# Patient Record
Sex: FEMALE | Race: ASIAN | NOT HISPANIC OR LATINO | Employment: PART TIME | URBAN - METROPOLITAN AREA
[De-identification: names, ages, dates, MRNs, and addresses within clinical notes are randomized per-mention and may not be internally consistent; named-entity substitution may affect disease eponyms.]

---

## 2022-07-16 ENCOUNTER — HOSPITAL ENCOUNTER (EMERGENCY)
Facility: HOSPITAL | Age: 22
Discharge: HOME | End: 2022-07-16
Attending: EMERGENCY MEDICINE | Admitting: EMERGENCY MEDICINE
Payer: COMMERCIAL

## 2022-07-16 ENCOUNTER — APPOINTMENT (OUTPATIENT)
Dept: RADIOLOGY | Facility: HOSPITAL | Age: 22
End: 2022-07-16
Attending: EMERGENCY MEDICINE
Payer: COMMERCIAL

## 2022-07-16 VITALS
OXYGEN SATURATION: 99 % | HEIGHT: 63 IN | SYSTOLIC BLOOD PRESSURE: 115 MMHG | TEMPERATURE: 98.6 F | DIASTOLIC BLOOD PRESSURE: 67 MMHG | HEART RATE: 78 BPM | RESPIRATION RATE: 24 BRPM | BODY MASS INDEX: 40.29 KG/M2 | WEIGHT: 227.4 LBS

## 2022-07-16 DIAGNOSIS — R07.9 CHEST PAIN, UNSPECIFIED TYPE: ICD-10-CM

## 2022-07-16 LAB
ALBUMIN SERPL-MCNC: 3.9 G/DL (ref 3.5–5)
ALBUMIN UR-MCNC: NEGATIVE MG/DL
ALP SERPL-CCNC: 97 U/L (ref 45–120)
ALT SERPL W P-5'-P-CCNC: 15 U/L (ref 0–45)
ANION GAP SERPL CALCULATED.3IONS-SCNC: 7 MMOL/L (ref 5–18)
APPEARANCE UR: CLEAR
AST SERPL W P-5'-P-CCNC: 14 U/L (ref 0–40)
BASOPHILS # BLD AUTO: 0 10E3/UL (ref 0–0.2)
BASOPHILS NFR BLD AUTO: 0 %
BILIRUB SERPL-MCNC: 0.2 MG/DL (ref 0–1)
BILIRUB UR QL STRIP: NEGATIVE
BNP SERPL-MCNC: 16 PG/ML (ref 0–64)
BUN SERPL-MCNC: 17 MG/DL (ref 8–22)
CALCIUM SERPL-MCNC: 8.7 MG/DL (ref 8.5–10.5)
CHLORIDE BLD-SCNC: 113 MMOL/L (ref 98–107)
CO2 SERPL-SCNC: 17 MMOL/L (ref 22–31)
COLOR UR AUTO: COLORLESS
CREAT SERPL-MCNC: 0.84 MG/DL (ref 0.6–1.1)
D DIMER PPP FEU-MCNC: 0.27 UG/ML FEU (ref 0–0.5)
EOSINOPHIL # BLD AUTO: 0.1 10E3/UL (ref 0–0.7)
EOSINOPHIL NFR BLD AUTO: 1 %
ERYTHROCYTE [DISTWIDTH] IN BLOOD BY AUTOMATED COUNT: 14.9 % (ref 10–15)
GFR SERPL CREATININE-BSD FRML MDRD: >90 ML/MIN/1.73M2
GLUCOSE BLD-MCNC: 94 MG/DL (ref 70–125)
GLUCOSE UR STRIP-MCNC: NEGATIVE MG/DL
HCT VFR BLD AUTO: 41.6 % (ref 35–47)
HGB BLD-MCNC: 13.6 G/DL (ref 11.7–15.7)
HGB UR QL STRIP: NEGATIVE
IMM GRANULOCYTES # BLD: 0 10E3/UL
IMM GRANULOCYTES NFR BLD: 0 %
KETONES UR STRIP-MCNC: NEGATIVE MG/DL
LEUKOCYTE ESTERASE UR QL STRIP: NEGATIVE
LYMPHOCYTES # BLD AUTO: 2.1 10E3/UL (ref 0.8–5.3)
LYMPHOCYTES NFR BLD AUTO: 23 %
MCH RBC QN AUTO: 29.4 PG (ref 26.5–33)
MCHC RBC AUTO-ENTMCNC: 32.7 G/DL (ref 31.5–36.5)
MCV RBC AUTO: 90 FL (ref 78–100)
MONOCYTES # BLD AUTO: 0.9 10E3/UL (ref 0–1.3)
MONOCYTES NFR BLD AUTO: 10 %
NEUTROPHILS # BLD AUTO: 6.2 10E3/UL (ref 1.6–8.3)
NEUTROPHILS NFR BLD AUTO: 66 %
NITRATE UR QL: NEGATIVE
NRBC # BLD AUTO: 0 10E3/UL
NRBC BLD AUTO-RTO: 0 /100
PH UR STRIP: 7.5 [PH] (ref 5–7)
PLATELET # BLD AUTO: 304 10E3/UL (ref 150–450)
POTASSIUM BLD-SCNC: 3.7 MMOL/L (ref 3.5–5)
PROT SERPL-MCNC: 8 G/DL (ref 6–8)
RBC # BLD AUTO: 4.62 10E6/UL (ref 3.8–5.2)
RBC URINE: <1 /HPF
SODIUM SERPL-SCNC: 137 MMOL/L (ref 136–145)
SP GR UR STRIP: 1.01 (ref 1–1.03)
SQUAMOUS EPITHELIAL: 1 /HPF
TROPONIN I SERPL-MCNC: <0.01 NG/ML (ref 0–0.29)
UROBILINOGEN UR STRIP-MCNC: <2 MG/DL
WBC # BLD AUTO: 9.3 10E3/UL (ref 4–11)
WBC URINE: <1 /HPF

## 2022-07-16 PROCEDURE — 85018 HEMOGLOBIN: CPT | Performed by: EMERGENCY MEDICINE

## 2022-07-16 PROCEDURE — 36415 COLL VENOUS BLD VENIPUNCTURE: CPT | Performed by: EMERGENCY MEDICINE

## 2022-07-16 PROCEDURE — 80053 COMPREHEN METABOLIC PANEL: CPT | Performed by: EMERGENCY MEDICINE

## 2022-07-16 PROCEDURE — 81001 URINALYSIS AUTO W/SCOPE: CPT | Performed by: EMERGENCY MEDICINE

## 2022-07-16 PROCEDURE — 93005 ELECTROCARDIOGRAM TRACING: CPT | Performed by: EMERGENCY MEDICINE

## 2022-07-16 PROCEDURE — 96374 THER/PROPH/DIAG INJ IV PUSH: CPT

## 2022-07-16 PROCEDURE — 250N000011 HC RX IP 250 OP 636: Performed by: EMERGENCY MEDICINE

## 2022-07-16 PROCEDURE — 85379 FIBRIN DEGRADATION QUANT: CPT | Performed by: EMERGENCY MEDICINE

## 2022-07-16 PROCEDURE — 71045 X-RAY EXAM CHEST 1 VIEW: CPT

## 2022-07-16 PROCEDURE — 99285 EMERGENCY DEPT VISIT HI MDM: CPT | Mod: 25

## 2022-07-16 PROCEDURE — 83880 ASSAY OF NATRIURETIC PEPTIDE: CPT | Performed by: EMERGENCY MEDICINE

## 2022-07-16 PROCEDURE — 84484 ASSAY OF TROPONIN QUANT: CPT | Performed by: EMERGENCY MEDICINE

## 2022-07-16 RX ORDER — KETOROLAC TROMETHAMINE 15 MG/ML
15 INJECTION, SOLUTION INTRAMUSCULAR; INTRAVENOUS ONCE
Status: COMPLETED | OUTPATIENT
Start: 2022-07-16 | End: 2022-07-16

## 2022-07-16 RX ADMIN — KETOROLAC TROMETHAMINE 15 MG: 15 INJECTION, SOLUTION INTRAMUSCULAR; INTRAVENOUS at 20:50

## 2022-07-17 NOTE — ED PROVIDER NOTES
EMERGENCY DEPARTMENT NOTE     Name: Catherine Cantor    Age/Sex: 22 year old female   MRN: [id number]   Evaluation Date & Time:  7/16/2022  8:06 PM    PCP:    No primary care provider on file.   ED Provider: Wilbert Fuentes D.O.       CHIEF COMPLAINT    Chest Pain and Shortness of Breath       DIAGNOSIS & DISPOSITION     1. Chest pain, unspecified type      DISPOSITION: Home    At the conclusion of the encounter I discussed the results of all of the tests and the disposition. The questions were answered. The patient or family acknowledged understanding and was agreeable with the care plan.    TOTAL CRITICAL CARE TIME (EXCLUDING PROCEDURES): Not applicable    PROCEDURES:   None    EMERGENCY DEPARTMENT COURSE/MEDICAL DECISION MAKING   8:07 PM I met with the patient to gather history and to perform my initial exam.  We discussed treatment options and the plan for care while in the Emergency Department.  10:11 PM We discussed the plan for discharge and the patient is agreeable. Reviewed supportive cares, symptomatic treatment, outpatient follow up, and reasons to return to the Emergency Department. Patient to be discharged by ED RN.     Catherine Cantor is a 22 year old female without significant past medical history who presents to the emergency department for evaluation of chest pain.  Triage note reviewed:  Pt report mid sternal chest pain for 3 days with shortness of breath.  Pt states increase shortness of breath with exertion      Triage Assessment     Row Name 07/16/22 2004       Triage Assessment (Adult)    Airway WDL WDL       Respiratory WDL    Respiratory WDL WDL       Skin Circulation/Temperature WDL    Skin Circulation/Temperature WDL WDL       Cardiac WDL    Cardiac WDL WDL       Peripheral/Neurovascular WDL    Peripheral Neurovascular WDL WDL       Cognitive/Neuro/Behavioral WDL    Cognitive/Neuro/Behavioral WDL WDL                Emergent causes of chest pain considered included but not limited to ACS,  "myocarditis/pericarditis, pulmonary embolism, thoracic aortic dissection, pneumothorax.  Patient does not have associated abdominal pain or history of vomiting to suggest Boerhaave syndrome.  Abdomen is nontender and reported no GI symptoms but considered referred GI source.  Vital signs:/67   Pulse 78   Temp 98.6  F (37  C) (Temporal)   Resp 24   Ht 1.6 m (5' 3\")   Wt 103.1 kg (227 lb 6.4 oz)   SpO2 99%   BMI 40.28 kg/m    Pertinent physical exam findings:  Cardiac: Regular rate and rhythm S1-S2 without murmur rub  Pulmonary: Lungs are clear to ascultation bilaterally with good breath sounds  Chest wall: Reproducible tenderness left sternal costal junction  Abdomen: Soft nontender, positive bowel sounds.  No organomegaly or mass  Diagnostic studies:  Imaging:  XR Chest Port 1 View   Final Result   IMPRESSION: Negative chest.         Lab:  Labs Ordered and Resulted from Time of ED Arrival to Time of ED Departure   COMPREHENSIVE METABOLIC PANEL - Abnormal       Result Value    Sodium 137      Potassium 3.7      Chloride 113 (*)     Carbon Dioxide (CO2) 17 (*)     Anion Gap 7      Urea Nitrogen 17      Creatinine 0.84      Calcium 8.7      Glucose 94      Alkaline Phosphatase 97      AST 14      ALT 15      Protein Total 8.0      Albumin 3.9      Bilirubin Total 0.2      GFR Estimate >90     ROUTINE UA WITH MICROSCOPIC REFLEX TO CULTURE - Abnormal    Color Urine Colorless      Appearance Urine Clear      Glucose Urine Negative      Bilirubin Urine Negative      Ketones Urine Negative      Specific Gravity Urine 1.010      Blood Urine Negative      pH Urine 7.5 (*)     Protein Albumin Urine Negative      Urobilinogen Urine <2.0      Nitrite Urine Negative      Leukocyte Esterase Urine Negative      RBC Urine <1      WBC Urine <1      Squamous Epithelials Urine 1     D DIMER QUANTITATIVE - Normal    D-Dimer Quantitative 0.27     TROPONIN I - Normal    Troponin I <0.01     B-TYPE NATRIURETIC PEPTIDE ([facility] " ONLY) - Normal    BNP 16     CBC WITH PLATELETS AND DIFFERENTIAL    WBC Count 9.3      RBC Count 4.62      Hemoglobin 13.6      Hematocrit 41.6      MCV 90      MCH 29.4      MCHC 32.7      RDW 14.9      Platelet Count 304      % Neutrophils 66      % Lymphocytes 23      % Monocytes 10      % Eosinophils 1      % Basophils 0      % Immature Granulocytes 0      NRBCs per 100 WBC 0      Absolute Neutrophils 6.2      Absolute Lymphocytes 2.1      Absolute Monocytes 0.9      Absolute Eosinophils 0.1      Absolute Basophils 0.0      Absolute Immature Granulocytes 0.0      Absolute NRBCs 0.0        Interventions: IV ketorolac  Medical decision making: EKG shows normal sinus rhythm without ST segment elevation or VT interval depression to suggest pericarditis.  Nonexertional with positional component without risk factors identified less likely ACS.  Troponin nonelevated BNP not elevated  Unlikely represent  myopericarditis.  Chest x-ray without infiltrate, pulmonary vascular congestion, infiltrate, pneumothorax and no cardiomegaly.  Mediastinum and aortic arch appear normal and low suspicion for thoracic aortic dissection and further evaluation with CTA not pursued.  Low suspicion for pulmonary embolism normal D-dimer is felt to exclude and further evaluation with CT not pursued.  HEART Score for Major Cardiac Events from [email or web address]  on 7/16/2022  RESULT SUMMARY:  0 points  Low Score (0-3 points)  Risk of MACE of 0.9-1.7%.  INPUTS:  History --> 0 = Slightly suspicious  EKG --> 0 = Normal  Age --> 0 = <45  Risk factors --> 0 = No known risk factors  Initial troponin --> 0 = ?normal limit  Patient's chest pain has resolved after IV ketorolac.  With current negative work-up and low risk for serious cause will discharge close outpatient follow-up.  Patient continue Tylenol ibuprofen if she has any recurrent pain.  She will follow-up with her primary care physician early next week.  If recurrent chest pain not improved with  "Tylenol or ibuprofen or development of any new symptoms including shortness of breath or fever will return the emergency department.    ED INTERVENTIONS     Medications   ketorolac (TORADOL) injection 15 mg (15 mg Intravenous Given 7/16/22 2050)       DISCHARGE MEDICATIONS        Review of your medicines      You have not been prescribed any medications.           INFORMATION SOURCE AND LIMITATIONS    History/Exam limitations: none  Patient information was obtained from: Patient  Use of : N/A    HISTORY OF PRESENT ILLNESS   Catherine Cantor is a 22 year old year old female with no relevant past history, who presents to this ED via walk-in for evaluation of chest pain and shortness of breath.    Patient reports left chest pain and shortness of breath that started 3 days ago. Patient describes the chest pain as \"aching\" and \"sometime sharp\". Patient denies radiating pain to jaw, neck, and arm. Patient also notes shortness of breath as \"heavier than usual\". Patient has diarrhea due to a side effect of a medication she is taking. She notes associated dysuria. Patient denies any heavy lifting. She denies history of asthma and birth control. No allergies. No smoking. Otherwise, patient denies fever, cold, sore throat, vomiting, and abdominal pain. No other complaints at this time.       REVIEW OF SYSTEMS:   Constitutional: Negative for  fever.   HENT: Negative for URI symptoms or sore throat.    Cardiac: Positive for left chest pain.Negative for palpitations, near syncope or syncope  Respiratory: Negative for cough. Positive for shortness of breath.    Gastrointestinal: Negative for abdominal pain, nausea, vomiting, constipation. Positive for diarrhea (not new). Negative for rectal bleeding or melena.  Genitourinary: Negative for dysuria, flank pain and hematuria.   Musculoskeletal: Negative for back pain.   Skin: Negative for  rash  Neurological: Negative for dizziness, headache, syncope, speech difficulty, " unilateral weakness or imbalance with walking.   Hematological: Negative for adenopathy. Does not bruise/bleed easily.   Psychiatric/Behavioral: Negative for confusion.       PATIENT HISTORY   History reviewed. No pertinent past medical history.  There is no problem list on file for this patient.    History reviewed. No pertinent surgical history.  Social Histrory  Smoking:None  Alcohol Use:None  No Known Allergies      OUTPATIENT MEDICATIONS     There are no discharge medications for this patient.     Vitals:    07/16/22 2130 07/16/22 2145 07/16/22 2200 07/16/22 2215   BP: 113/68 110/73 113/72 115/67   Pulse: 75 74 79 78   Resp: 24 25 23 24   Temp:       TempSrc:       SpO2: 98% 98% 97% 99%   Weight:       Height:           Physical Exam   Constitutional: Oriented to person, place, and time. Appears well-developed and well-nourished.   HEENT:    Head: Atraumatic.   Neck: Normal range of motion. Neck supple.   Cardiovascular: Normal rate, regular rhythm and normal heart sounds.    Pulmonary/Chest: Normal effort  and breath sounds normal. Tenderness to left chest wall.   Abdominal: Soft. Bowel sounds are normal.   Musculoskeletal: Normal range of motion.   Neurological: Alert and oriented to person, place, and time. Normal strength.No sensory deficit. No cranial nerve deficit . Skin: Skin is warm and dry.   Psychiatric: Normal mood and affect. Behavior is normal. Thought content normal.       DIAGNOSTICS    LABORATORY FINDINGS (REVIEWED AND INTERPRETED):  Labs Ordered and Resulted from Time of ED Arrival to Time of ED Departure   COMPREHENSIVE METABOLIC PANEL - Abnormal       Result Value    Sodium 137      Potassium 3.7      Chloride 113 (*)     Carbon Dioxide (CO2) 17 (*)     Anion Gap 7      Urea Nitrogen 17      Creatinine 0.84      Calcium 8.7      Glucose 94      Alkaline Phosphatase 97      AST 14      ALT 15      Protein Total 8.0      Albumin 3.9      Bilirubin Total 0.2      GFR Estimate >90     ROUTINE UA  WITH MICROSCOPIC REFLEX TO CULTURE - Abnormal    Color Urine Colorless      Appearance Urine Clear      Glucose Urine Negative      Bilirubin Urine Negative      Ketones Urine Negative      Specific Gravity Urine 1.010      Blood Urine Negative      pH Urine 7.5 (*)     Protein Albumin Urine Negative      Urobilinogen Urine <2.0      Nitrite Urine Negative      Leukocyte Esterase Urine Negative      RBC Urine <1      WBC Urine <1      Squamous Epithelials Urine 1     D DIMER QUANTITATIVE - Normal    D-Dimer Quantitative 0.27     TROPONIN I - Normal    Troponin I <0.01     B-TYPE NATRIURETIC PEPTIDE ( [place] ONLY) - Normal    BNP 16     CBC WITH PLATELETS AND DIFFERENTIAL    WBC Count 9.3      RBC Count 4.62      Hemoglobin 13.6      Hematocrit 41.6      MCV 90      MCH 29.4      MCHC 32.7      RDW 14.9      Platelet Count 304      % Neutrophils 66      % Lymphocytes 23      % Monocytes 10      % Eosinophils 1      % Basophils 0      % Immature Granulocytes 0      NRBCs per 100 WBC 0      Absolute Neutrophils 6.2      Absolute Lymphocytes 2.1      Absolute Monocytes 0.9      Absolute Eosinophils 0.1      Absolute Basophils 0.0      Absolute Immature Granulocytes 0.0      Absolute NRBCs 0.0           IMAGING (REVIEWED AND INTERPRETED):  XR Chest Port 1 View   Final Result   IMPRESSION: Negative chest.              ECG (REVIEWED AND INTERPRETED):   ECG:   Performed at: [facility], 16-JUL-2022 20:15:30  HR:  89 bpm  Rhythm: Sinus   Axis: 46   QRS duration: 78 ms  QTC: 447 ms  ST changes: None  Interpretation: Sinus rhythm. . No previous ECGs available.   Compared to most recent ECG from: No prior for comparison    I have reviewed the patient's ECG, with comments made as listed above. Please see scanned image for full interpretation.       I, Cindy Her, am serving as a scribe to document services personally performed by Wilbert Fuentes D.O., based on my observation and the provider s statements to  me.    I, Wilbert Fuentes D.O., attest that Cindy Her is acting in a scribe capacity, has observed my performance of the services and has documented them in accordance with my direction.    Wilbert Fuentes D.O.  EMERGENCY MEDICINE   07/16/22  [facility] EMERGENCY DEPARTMENT  [address] [place] [id number]  [phone]  Dept: [phone]     Wilbert Fuentes,   07/16/22 2324       Wilbert Fuentes,   07/16/22 2323

## 2022-07-17 NOTE — ED TRIAGE NOTES
Pt report mid sternal chest pain for 3 days with shortness of breath.  Pt states increase shortness of breath with exertion      Triage Assessment     Row Name 07/16/22 2004       Triage Assessment (Adult)    Airway WDL WDL       Respiratory WDL    Respiratory WDL WDL       Skin Circulation/Temperature WDL    Skin Circulation/Temperature WDL WDL       Cardiac WDL    Cardiac WDL WDL       Peripheral/Neurovascular WDL    Peripheral Neurovascular WDL WDL       Cognitive/Neuro/Behavioral WDL    Cognitive/Neuro/Behavioral WDL WDL

## 2022-07-17 NOTE — ED NOTES
Pt comes in with complaints of chest pain in midsternal area, achy. Pt has had increased anxiety as well per statement. Denies coughing or sob. VSS. Pain 6/10. ketoradol given.

## 2022-07-17 NOTE — DISCHARGE INSTRUCTIONS
Take ibuprofen 600 mg every 8 hours and Tylenol 1 g every 8 hours for pain management.  If you have recurrent pain not improved with Tylenol ibuprofen or develop any new symptoms including shortness of breath return to the emergency department.  Follow-up with Dr. Jeffrey next week for reevaluation otherwise.

## 2022-07-19 LAB
ATRIAL RATE - MUSE: 89 BPM
DIASTOLIC BLOOD PRESSURE - MUSE: 80 MMHG
INTERPRETATION ECG - MUSE: NORMAL
P AXIS - MUSE: 42 DEGREES
PR INTERVAL - MUSE: 148 MS
QRS DURATION - MUSE: 78 MS
QT - MUSE: 368 MS
QTC - MUSE: 447 MS
R AXIS - MUSE: 46 DEGREES
SYSTOLIC BLOOD PRESSURE - MUSE: 121 MMHG
T AXIS - MUSE: 7 DEGREES
VENTRICULAR RATE- MUSE: 89 BPM

## 2023-10-06 ENCOUNTER — LAB REQUISITION (OUTPATIENT)
Dept: LAB | Facility: CLINIC | Age: 23
End: 2023-10-06
Payer: COMMERCIAL

## 2023-10-06 DIAGNOSIS — N91.2 AMENORRHEA, UNSPECIFIED: ICD-10-CM

## 2023-10-06 DIAGNOSIS — M25.50 PAIN IN UNSPECIFIED JOINT: ICD-10-CM

## 2023-10-06 LAB — ERYTHROCYTE [SEDIMENTATION RATE] IN BLOOD BY WESTERGREN METHOD: 19 MM/HR (ref 0–20)

## 2023-10-06 PROCEDURE — 86140 C-REACTIVE PROTEIN: CPT | Mod: ORL | Performed by: FAMILY MEDICINE

## 2023-10-06 PROCEDURE — 80053 COMPREHEN METABOLIC PANEL: CPT | Mod: ORL | Performed by: FAMILY MEDICINE

## 2023-10-06 PROCEDURE — 84443 ASSAY THYROID STIM HORMONE: CPT | Mod: ORL | Performed by: FAMILY MEDICINE

## 2023-10-06 PROCEDURE — 84146 ASSAY OF PROLACTIN: CPT | Mod: ORL | Performed by: FAMILY MEDICINE

## 2023-10-06 PROCEDURE — 82607 VITAMIN B-12: CPT | Mod: ORL | Performed by: FAMILY MEDICINE

## 2023-10-06 PROCEDURE — 85652 RBC SED RATE AUTOMATED: CPT | Mod: ORL | Performed by: FAMILY MEDICINE

## 2023-10-07 LAB
ALBUMIN SERPL BCG-MCNC: 4.2 G/DL (ref 3.5–5.2)
ALP SERPL-CCNC: 85 U/L (ref 35–104)
ALT SERPL W P-5'-P-CCNC: 25 U/L (ref 0–50)
ANION GAP SERPL CALCULATED.3IONS-SCNC: 13 MMOL/L (ref 7–15)
AST SERPL W P-5'-P-CCNC: 17 U/L (ref 0–45)
BILIRUB SERPL-MCNC: 0.3 MG/DL
BUN SERPL-MCNC: 14.4 MG/DL (ref 6–20)
CALCIUM SERPL-MCNC: 9.3 MG/DL (ref 8.6–10)
CHLORIDE SERPL-SCNC: 103 MMOL/L (ref 98–107)
CREAT SERPL-MCNC: 0.65 MG/DL (ref 0.51–0.95)
CRP SERPL-MCNC: <3 MG/L
DEPRECATED HCO3 PLAS-SCNC: 22 MMOL/L (ref 22–29)
EGFRCR SERPLBLD CKD-EPI 2021: >90 ML/MIN/1.73M2
GLUCOSE SERPL-MCNC: 81 MG/DL (ref 70–99)
POTASSIUM SERPL-SCNC: 4 MMOL/L (ref 3.4–5.3)
PROLACTIN SERPL 3RD IS-MCNC: 16 NG/ML (ref 5–23)
PROT SERPL-MCNC: 8 G/DL (ref 6.4–8.3)
SODIUM SERPL-SCNC: 138 MMOL/L (ref 135–145)
TSH SERPL DL<=0.005 MIU/L-ACNC: 2.03 UIU/ML (ref 0.3–4.2)
VIT B12 SERPL-MCNC: 336 PG/ML (ref 232–1245)

## 2025-05-15 ENCOUNTER — LAB REQUISITION (OUTPATIENT)
Dept: LAB | Facility: CLINIC | Age: 25
End: 2025-05-15
Payer: COMMERCIAL

## 2025-05-15 LAB
CHOLEST SERPL-MCNC: 284 MG/DL
FASTING STATUS PATIENT QL REPORTED: YES
HDLC SERPL-MCNC: 25 MG/DL
LDLC SERPL CALC-MCNC: 216 MG/DL
NONHDLC SERPL-MCNC: 259 MG/DL
TRIGL SERPL-MCNC: 216 MG/DL

## 2025-05-15 PROCEDURE — 80061 LIPID PANEL: CPT | Mod: ORL | Performed by: FAMILY MEDICINE

## 2025-05-27 ENCOUNTER — LAB REQUISITION (OUTPATIENT)
Dept: LAB | Facility: CLINIC | Age: 25
End: 2025-05-27
Payer: COMMERCIAL

## 2025-05-27 DIAGNOSIS — R10.84 GENERALIZED ABDOMINAL PAIN: ICD-10-CM

## 2025-05-27 DIAGNOSIS — D64.9 ANEMIA, UNSPECIFIED: ICD-10-CM

## 2025-05-27 LAB
IRON BINDING CAPACITY (ROCHE): 192 UG/DL (ref 240–430)
IRON SATN MFR SERPL: 18 % (ref 15–46)
IRON SERPL-MCNC: 35 UG/DL (ref 37–145)
VIT B12 SERPL-MCNC: 303 PG/ML (ref 232–1245)

## 2025-05-27 PROCEDURE — 80053 COMPREHEN METABOLIC PANEL: CPT | Mod: ORL | Performed by: FAMILY MEDICINE

## 2025-05-28 ENCOUNTER — LAB REQUISITION (OUTPATIENT)
Dept: LAB | Facility: CLINIC | Age: 25
End: 2025-05-28
Payer: COMMERCIAL

## 2025-05-28 DIAGNOSIS — R10.84 GENERALIZED ABDOMINAL PAIN: ICD-10-CM

## 2025-05-28 LAB
ALBUMIN SERPL BCG-MCNC: 3 G/DL (ref 3.5–5.2)
ALP SERPL-CCNC: 68 U/L (ref 40–150)
ALT SERPL W P-5'-P-CCNC: 25 U/L (ref 0–50)
ANION GAP SERPL CALCULATED.3IONS-SCNC: 12 MMOL/L (ref 7–15)
AST SERPL W P-5'-P-CCNC: 28 U/L (ref 0–45)
BILIRUB SERPL-MCNC: 0.2 MG/DL
BUN SERPL-MCNC: 16.6 MG/DL (ref 6–20)
CALCIUM SERPL-MCNC: 8.3 MG/DL (ref 8.8–10.4)
CHLORIDE SERPL-SCNC: 106 MMOL/L (ref 98–107)
CREAT SERPL-MCNC: 1.02 MG/DL (ref 0.51–0.95)
EGFRCR SERPLBLD CKD-EPI 2021: 77 ML/MIN/1.73M2
GLUCOSE SERPL-MCNC: 82 MG/DL (ref 70–99)
HCO3 SERPL-SCNC: 20 MMOL/L (ref 22–29)
POTASSIUM SERPL-SCNC: 3.6 MMOL/L (ref 3.4–5.3)
PROT SERPL-MCNC: 7.7 G/DL (ref 6.4–8.3)
SODIUM SERPL-SCNC: 138 MMOL/L (ref 135–145)

## 2025-05-28 PROCEDURE — 87338 HPYLORI STOOL AG IA: CPT | Mod: ORL | Performed by: FAMILY MEDICINE

## 2025-05-29 LAB
BACTERIA UR CULT: NORMAL
H PYLORI AG STL QL IA: NEGATIVE

## 2025-05-30 LAB
GLIADIN IGA SER-ACNC: 1.2 U/ML
GLIADIN IGG SER-ACNC: 1 U/ML
IGA SERPL-MCNC: 76 MG/DL (ref 84–499)
TTG IGA SER-ACNC: 0.5 U/ML
TTG IGG SER-ACNC: 1.6 U/ML